# Patient Record
Sex: MALE | Race: WHITE | NOT HISPANIC OR LATINO | Employment: FULL TIME | URBAN - METROPOLITAN AREA
[De-identification: names, ages, dates, MRNs, and addresses within clinical notes are randomized per-mention and may not be internally consistent; named-entity substitution may affect disease eponyms.]

---

## 2017-07-25 ENCOUNTER — CLINICAL SUPPORT (OUTPATIENT)
Dept: OCCUPATIONAL MEDICINE | Facility: CLINIC | Age: 22
End: 2017-07-25

## 2017-07-25 DIAGNOSIS — Z02.83 EMPLOYMENT-RELATED DRUG TESTING, ENCOUNTER FOR: Primary | ICD-10-CM

## 2017-07-25 LAB
CTP QC/QA: YES
POC 10 PANEL DRUG SCREEN: NEGATIVE

## 2017-07-25 PROCEDURE — 80305 DRUG TEST PRSMV DIR OPT OBS: CPT | Mod: QW,S$GLB,, | Performed by: PREVENTIVE MEDICINE

## 2019-06-04 ENCOUNTER — HOSPITAL ENCOUNTER (OUTPATIENT)
Dept: RADIOLOGY | Facility: HOSPITAL | Age: 24
Discharge: HOME | End: 2019-06-04
Attending: ORTHOPAEDIC SURGERY
Payer: OTHER MISCELLANEOUS

## 2019-06-04 ENCOUNTER — OFFICE VISIT (OUTPATIENT)
Dept: ORTHOPEDICS | Facility: CLINIC | Age: 24
End: 2019-06-04
Payer: OTHER MISCELLANEOUS

## 2019-06-04 VITALS
BODY MASS INDEX: 30.8 KG/M2 | DIASTOLIC BLOOD PRESSURE: 80 MMHG | SYSTOLIC BLOOD PRESSURE: 131 MMHG | HEART RATE: 89 BPM | WEIGHT: 220 LBS | HEIGHT: 71 IN

## 2019-06-04 DIAGNOSIS — M79.672 LEFT FOOT PAIN: Primary | ICD-10-CM

## 2019-06-04 DIAGNOSIS — M79.672 LEFT FOOT PAIN: ICD-10-CM

## 2019-06-04 DIAGNOSIS — S93.492A SPRAIN OF ANTERIOR TALOFIBULAR LIGAMENT OF LEFT ANKLE, INITIAL ENCOUNTER: ICD-10-CM

## 2019-06-04 PROCEDURE — 73610 X-RAY EXAM OF ANKLE: CPT | Mod: 26,LT,, | Performed by: RADIOLOGY

## 2019-06-04 PROCEDURE — 99203 OFFICE O/P NEW LOW 30 MIN: CPT | Mod: S$GLB,,, | Performed by: ORTHOPAEDIC SURGERY

## 2019-06-04 PROCEDURE — 99999 PR PBB SHADOW E&M-EST. PATIENT-LVL III: CPT | Mod: PBBFAC,,, | Performed by: ORTHOPAEDIC SURGERY

## 2019-06-04 PROCEDURE — 99999 PR PBB SHADOW E&M-EST. PATIENT-LVL III: ICD-10-PCS | Mod: PBBFAC,,, | Performed by: ORTHOPAEDIC SURGERY

## 2019-06-04 PROCEDURE — 73610 X-RAY EXAM OF ANKLE: CPT | Mod: TC,PO,LT

## 2019-06-04 PROCEDURE — 73630 XR FOOT COMPLETE 3 VIEW LEFT: ICD-10-PCS | Mod: 26,LT,, | Performed by: RADIOLOGY

## 2019-06-04 PROCEDURE — 73630 X-RAY EXAM OF FOOT: CPT | Mod: TC,PO,LT

## 2019-06-04 PROCEDURE — 99203 PR OFFICE/OUTPT VISIT, NEW, LEVL III, 30-44 MIN: ICD-10-PCS | Mod: S$GLB,,, | Performed by: ORTHOPAEDIC SURGERY

## 2019-06-04 PROCEDURE — 73610 XR ANKLE COMPLETE 3 VIEW LEFT: ICD-10-PCS | Mod: 26,LT,, | Performed by: RADIOLOGY

## 2019-06-04 PROCEDURE — 73630 X-RAY EXAM OF FOOT: CPT | Mod: 26,LT,, | Performed by: RADIOLOGY

## 2019-06-04 NOTE — PROGRESS NOTES
"HPI: Noel Thomas is a  23 y.o. male who complains of left foot and ankle pain. The pain began acutely when he was loading the back of a truck at work and he stepped back and rolled his ankle. This is a worker's compensation injury. DOI: 5/22/19. Pt denies weakness, numbness, and tingling. He rates his pain as 0/10 today at rest and worse with weight bearing. He was seen in the ER and placed in a boot.     PAST MEDICAL/SURGICAL/FAMILY/SOCIAL/ HISTORY: REVIEWED    ALLERGIES/MEDICATIONS: REVIEWED       Review of Systems:     Constitution: Negative.   HEENT: Negative.   Eyes: Negative.   Cardiovascular: Negative.   Respiratory: Negative.   Endocrine: Negative.   Hematologic/Lymphatic: Negative.   Skin: Negative.   Musculoskeletal: Positive for left foot and ankle pain   Gastrointestinal: Negative.   Genitourinary: Negative.   Neurological: Negative.   Psychiatric/Behavioral: Negative.   Allergic/Immunologic: Negative.       PHYSICAL EXAM:  Vitals:    06/04/19 1404   BP: 131/80   Pulse: 89     Ht Readings from Last 1 Encounters:   06/04/19 5' 11" (1.803 m)     Wt Readings from Last 1 Encounters:   06/04/19 99.8 kg (220 lb)       GENERAL: Well developed, well nourished, no acute distress.  SKIN: Skin is intact. No atrophy, abrasions or lesions are noted.   Neurological: Normal mental status. Appropriate and conversant. Alert and oriented x 3.  GAIT: Walks with an antalgic gait.    Left  lower extremity compared with RLE:  2+ dorsalis pedis pulse.  Capillary refill < 3 seconds. Decreased range of motion tibiotalar and subtalar joints. Normal alignment of the forefoot and the hindfoot. Limited exam due to pain and swelling. Sensation to light touch intact sural, saphenous, superficial peroneal and deep peroneal nerves. + moderate swelling and ecchymosis of the foot. No lymphadenopathy, no masses or tumors palpated.  Very tender to palpation at the peroneal tendons and the ATFL. non-tender to palpation medially. Negative " anterior drawer test.     XRAYS:   3 views of left foot and ankle obtained and reviewed today reveal No evidence of fractures or dislocations.       ASSESSMENT:         Encounter Diagnosis   Name Primary?    Sprain of anterior talofibular ligament of left ankle, initial encounter       PLAN:   I spent 15 minutes in consulation with the patient and his supervisor today. More than half the time was spent counseling the patient on his condition. I instructed him on range of motion exercises. Continue Weight bearing as tolerated in the boot. Apply a compressive ACE bandage. Rest and elevate the affected painful area.  Apply cold compresses intermittently as needed. Work as tolerated. PT 2/6, ok to wean out of boot as tolerated.  F/u 3 weeks, no xray.

## 2019-06-10 ENCOUNTER — TELEPHONE (OUTPATIENT)
Dept: ORTHOPEDICS | Facility: CLINIC | Age: 24
End: 2019-06-10

## 2019-06-10 NOTE — TELEPHONE ENCOUNTER
----- Message from Mayda Gomez sent at 6/10/2019  9:48 AM CDT -----  Contact: Dayan Alicea   Type: Needs Medical Advice    Who Called:  Dayan Alicea   Best Call Back Number: [phone]  Additional Information: needs office visit notes for 06/04/2019 faxed to [phone] and she would also like to know if the doctor allows  to attend the visits. Please advise

## 2019-06-25 ENCOUNTER — OFFICE VISIT (OUTPATIENT)
Dept: ORTHOPEDICS | Facility: CLINIC | Age: 24
End: 2019-06-25
Payer: OTHER MISCELLANEOUS

## 2019-06-25 VITALS
BODY MASS INDEX: 30.8 KG/M2 | HEIGHT: 71 IN | SYSTOLIC BLOOD PRESSURE: 135 MMHG | DIASTOLIC BLOOD PRESSURE: 88 MMHG | WEIGHT: 220 LBS | HEART RATE: 89 BPM

## 2019-06-25 DIAGNOSIS — S93.492D SPRAIN OF ANTERIOR TALOFIBULAR LIGAMENT OF LEFT ANKLE, SUBSEQUENT ENCOUNTER: Primary | ICD-10-CM

## 2019-06-25 PROCEDURE — 99214 OFFICE O/P EST MOD 30 MIN: CPT | Mod: S$GLB,,, | Performed by: ORTHOPAEDIC SURGERY

## 2019-06-25 PROCEDURE — 99214 PR OFFICE/OUTPT VISIT, EST, LEVL IV, 30-39 MIN: ICD-10-PCS | Mod: S$GLB,,, | Performed by: ORTHOPAEDIC SURGERY

## 2019-06-25 PROCEDURE — 99999 PR PBB SHADOW E&M-EST. PATIENT-LVL III: CPT | Mod: PBBFAC,,, | Performed by: ORTHOPAEDIC SURGERY

## 2019-06-25 PROCEDURE — 99999 PR PBB SHADOW E&M-EST. PATIENT-LVL III: ICD-10-PCS | Mod: PBBFAC,,, | Performed by: ORTHOPAEDIC SURGERY

## 2019-07-02 NOTE — PROGRESS NOTES
"HPI: Noel Thomas is a  23 y.o. male who is here for f/u today for left ankle sprain. DOI: 5/22/19. He rates his pain as 2/10 today.     PAST MEDICAL/SURGICAL/FAMILY/SOCIAL/ HISTORY: REVIEWED    ALLERGIES/MEDICATIONS: REVIEWED       Review of Systems:     Constitution: Negative.   HEENT: Negative.   Eyes: Negative.   Cardiovascular: Negative.   Respiratory: Negative.   Endocrine: Negative.   Hematologic/Lymphatic: Negative.   Skin: Negative.   Musculoskeletal: Positive for left foot and ankle pain   Gastrointestinal: Negative.   Genitourinary: Negative.   Neurological: Negative.   Psychiatric/Behavioral: Negative.   Allergic/Immunologic: Negative.       PHYSICAL EXAM:  Vitals:    06/25/19 1344   BP: 135/88   Pulse: 89     Ht Readings from Last 1 Encounters:   06/25/19 5' 11" (1.803 m)     Wt Readings from Last 1 Encounters:   06/25/19 99.8 kg (220 lb)       GENERAL: Well developed, well nourished, no acute distress.    Left  lower extremity compared with RLE:  neurovascularly intact, + mild swelling.  tender to palpation at the peroneal tendons and the ATFL. Near full range of motion of the ankle. dorsiflexion -5 degrees,  plantar flexion -10 degrees.  Negative anterior drawer test.     XRAYS:   3 views of left foot and ankle obtained and reviewed today reveal No evidence of fractures or dislocations.       ASSESSMENT:     left ankle sprain    PLAN:    Weight bearing as tolerated in regular shoe. Ankle sleeve given. Continue PT.  I will keep him off of work at this time as I do not think he can climb and sqat.  F/u 4 weeks, no xray.   "

## 2019-07-18 ENCOUNTER — TELEPHONE (OUTPATIENT)
Dept: ORTHOPEDICS | Facility: CLINIC | Age: 24
End: 2019-07-18

## 2019-07-18 NOTE — TELEPHONE ENCOUNTER
----- Message from Ishaan Queen sent at 7/18/2019  2:13 PM CDT -----  Contact: Dayan with [employer]  Type: Needs Medical Advice    Who Called:  Dayan with [employer]  Best Call Back Number: [phone] Fax: [phone]  Additional Information: Requesting office visit notes from appointment on 6/25/19. Please advise when sent

## 2019-07-23 ENCOUNTER — OFFICE VISIT (OUTPATIENT)
Dept: ORTHOPEDICS | Facility: CLINIC | Age: 24
End: 2019-07-23
Payer: COMMERCIAL

## 2019-07-23 VITALS
SYSTOLIC BLOOD PRESSURE: 126 MMHG | DIASTOLIC BLOOD PRESSURE: 85 MMHG | BODY MASS INDEX: 30.8 KG/M2 | WEIGHT: 220 LBS | HEIGHT: 71 IN | HEART RATE: 78 BPM

## 2019-07-23 DIAGNOSIS — S93.492D SPRAIN OF ANTERIOR TALOFIBULAR LIGAMENT OF LEFT ANKLE, SUBSEQUENT ENCOUNTER: Primary | ICD-10-CM

## 2019-07-23 PROCEDURE — 99213 PR OFFICE/OUTPT VISIT, EST, LEVL III, 20-29 MIN: ICD-10-PCS | Mod: S$GLB,,, | Performed by: ORTHOPAEDIC SURGERY

## 2019-07-23 PROCEDURE — 99213 OFFICE O/P EST LOW 20 MIN: CPT | Mod: S$GLB,,, | Performed by: ORTHOPAEDIC SURGERY

## 2019-07-23 PROCEDURE — 99999 PR PBB SHADOW E&M-EST. PATIENT-LVL III: ICD-10-PCS | Mod: PBBFAC,,, | Performed by: ORTHOPAEDIC SURGERY

## 2019-07-23 PROCEDURE — 99999 PR PBB SHADOW E&M-EST. PATIENT-LVL III: CPT | Mod: PBBFAC,,, | Performed by: ORTHOPAEDIC SURGERY

## 2019-07-23 NOTE — PROGRESS NOTES
"HPI: Noel Thomas is a  23 y.o. male who is here for f/u today for left ankle sprain. DOI: 5/22/19. He rates his pain as 1/10 today. He says the achilles just feels a little tight. He is here early for f/u as he would like to return to work.     PAST MEDICAL/SURGICAL/FAMILY/SOCIAL/ HISTORY: REVIEWED    ALLERGIES/MEDICATIONS: REVIEWED     PHYSICAL EXAM:  Vitals:    07/23/19 1311   BP: 126/85   Pulse: 78     Ht Readings from Last 1 Encounters:   07/23/19 5' 11" (1.803 m)     Wt Readings from Last 1 Encounters:   07/23/19 99.8 kg (220 lb 0.3 oz)       GENERAL: Well developed, well nourished, no acute distress.    Left  lower extremity compared with RLE:  neurovascularly intact, no swelling.  Non-tender to palpation at the peroneal tendons and the ATFL.  full range of motion of the ankle. Negative anterior drawer test.       ASSESSMENT:     left ankle sprain, resolved    PLAN:  Return to work no restrictions. I will discharge him from my care for this injury.   "

## 2019-07-30 ENCOUNTER — TELEPHONE (OUTPATIENT)
Dept: ORTHOPEDICS | Facility: CLINIC | Age: 24
End: 2019-07-30

## 2019-07-30 NOTE — TELEPHONE ENCOUNTER
----- Message from Latasha Duarte MA sent at 7/30/2019 11:29 AM CDT -----  Contact: angelita, [employer],   Would like recent office notes   Fax     Call back if needed  [phone]

## 2019-09-12 ENCOUNTER — TELEPHONE (OUTPATIENT)
Dept: ORTHOPEDICS | Facility: CLINIC | Age: 24
End: 2019-09-12

## 2019-09-12 NOTE — TELEPHONE ENCOUNTER
Kwasi for patient to return call to determine if he wants his records sent to Laila with [employer].

## 2019-09-12 NOTE — TELEPHONE ENCOUNTER
----- Message from Antonia Bartlett sent at 9/12/2019  2:06 PM CDT -----  Contact: Laila / [employer] // [phone]  Calling to get the last office visit notes from 7/23  Please fax to [phone].